# Patient Record
Sex: FEMALE | Race: WHITE
[De-identification: names, ages, dates, MRNs, and addresses within clinical notes are randomized per-mention and may not be internally consistent; named-entity substitution may affect disease eponyms.]

---

## 2017-01-02 ENCOUNTER — HOSPITAL ENCOUNTER (OUTPATIENT)
Dept: HOSPITAL 45 - C.LABUPUNI | Age: 82
Discharge: NURSING, REHAB OR LONG-TERM CARE | End: 2017-01-02
Attending: HOSPITALIST
Payer: COMMERCIAL

## 2017-01-02 DIAGNOSIS — I48.2: Primary | ICD-10-CM

## 2017-01-02 LAB
INR PPP: 3.7 (ref 0.9–1.1)
PROTHROMBIN TIME: 42.3 SECONDS (ref 9–12)

## 2017-01-05 ENCOUNTER — HOSPITAL ENCOUNTER (OUTPATIENT)
Dept: HOSPITAL 45 - C.LABUPUNI | Age: 82
Discharge: NURSING, REHAB OR LONG-TERM CARE | End: 2017-01-05
Attending: HOSPITALIST
Payer: COMMERCIAL

## 2017-01-05 DIAGNOSIS — E03.9: ICD-10-CM

## 2017-01-05 DIAGNOSIS — M62.81: Primary | ICD-10-CM

## 2017-01-05 LAB
ALBUMIN/GLOB SERPL: 0.7 {RATIO} (ref 0.9–2)
ALP SERPL-CCNC: 59 U/L (ref 45–117)
ALT SERPL-CCNC: 12 U/L (ref 12–78)
ANION GAP SERPL CALC-SCNC: 9 MMOL/L (ref 3–11)
AST SERPL-CCNC: 20 U/L (ref 15–37)
BASOPHILS # BLD: 0.04 K/UL (ref 0–0.2)
BASOPHILS NFR BLD: 0.6 %
BUN SERPL-MCNC: 17 MG/DL (ref 7–18)
BUN/CREAT SERPL: 17.1 (ref 10–20)
CALCIUM SERPL-MCNC: 8.3 MG/DL (ref 8.5–10.1)
CHLORIDE SERPL-SCNC: 108 MMOL/L (ref 98–107)
CO2 SERPL-SCNC: 26 MMOL/L (ref 21–32)
COMPLETE: YES
CREAT SERPL-MCNC: 0.98 MG/DL (ref 0.6–1.2)
EOSINOPHIL NFR BLD AUTO: 249 K/UL (ref 130–400)
GLOBULIN SER-MCNC: 4 GM/DL (ref 2.5–4)
GLUCOSE SERPL-MCNC: 74 MG/DL (ref 70–99)
HCT VFR BLD CALC: 31.3 % (ref 37–47)
IG%: 0.2 %
IMM GRANULOCYTES NFR BLD AUTO: 41.2 %
LYMPHOCYTES # BLD: 2.63 K/UL (ref 1.2–3.4)
MCH RBC QN AUTO: 29.5 PG (ref 25–34)
MCHC RBC AUTO-ENTMCNC: 32.3 G/DL (ref 32–36)
MCV RBC AUTO: 91.5 FL (ref 80–100)
MONOCYTES NFR BLD: 11.3 %
NEUTROPHILS # BLD AUTO: 5.2 %
NEUTROPHILS NFR BLD AUTO: 41.5 %
PMV BLD AUTO: 10.4 FL (ref 7.4–10.4)
POTASSIUM SERPL-SCNC: 3.9 MMOL/L (ref 3.5–5.1)
RBC # BLD AUTO: 3.42 M/UL (ref 4.2–5.4)
SODIUM SERPL-SCNC: 143 MMOL/L (ref 136–145)
TSH SERPL-ACNC: 1.35 UIU/ML (ref 0.3–4.5)
WBC # BLD AUTO: 6.39 K/UL (ref 4.8–10.8)

## 2017-01-06 ENCOUNTER — HOSPITAL ENCOUNTER (OUTPATIENT)
Dept: HOSPITAL 45 - C.LABUPUNI | Age: 82
Discharge: NURSING, REHAB OR LONG-TERM CARE | End: 2017-01-06
Attending: HOSPITALIST
Payer: COMMERCIAL

## 2017-01-06 DIAGNOSIS — F33.9: ICD-10-CM

## 2017-01-06 DIAGNOSIS — M16.9: ICD-10-CM

## 2017-01-06 DIAGNOSIS — E03.9: Primary | ICD-10-CM

## 2017-01-06 DIAGNOSIS — D64.89: ICD-10-CM

## 2017-01-06 DIAGNOSIS — M17.9: ICD-10-CM

## 2017-01-06 DIAGNOSIS — E78.5: ICD-10-CM

## 2017-01-06 DIAGNOSIS — F10.97: ICD-10-CM

## 2017-01-06 LAB
ALP SERPL-CCNC: 53 U/L (ref 45–117)
ALT SERPL-CCNC: 13 U/L (ref 12–78)
ANION GAP SERPL CALC-SCNC: 9 MMOL/L (ref 3–11)
AST SERPL-CCNC: 21 U/L (ref 15–37)
BUN SERPL-MCNC: 15 MG/DL (ref 7–18)
BUN/CREAT SERPL: 16.6 (ref 10–20)
CALCIUM SERPL-MCNC: 8.1 MG/DL (ref 8.5–10.1)
CHLORIDE SERPL-SCNC: 107 MMOL/L (ref 98–107)
CHOLEST/HDLC SERPL: 3.5 {RATIO}
CO2 SERPL-SCNC: 27 MMOL/L (ref 21–32)
CREAT SERPL-MCNC: 0.92 MG/DL (ref 0.6–1.2)
GLUCOSE SERPL-MCNC: 70 MG/DL (ref 70–99)
GLUCOSE UR QL: 33 MG/DL
INR PPP: 5.5 (ref 0.9–1.1)
KETONES UR QL STRIP: 45 MG/DL
NITRITE UR QL STRIP: 181 MG/DL (ref 0–150)
PH UR: 114 MG/DL (ref 0–200)
POTASSIUM SERPL-SCNC: 3.8 MMOL/L (ref 3.5–5.1)
PROTHROMBIN TIME: 62.7 SECONDS (ref 9–12)
SODIUM SERPL-SCNC: 143 MMOL/L (ref 136–145)
TSH SERPL-ACNC: 1.38 UIU/ML (ref 0.3–4.5)
VERY LOW DENSITY LIPOPROT CALC: 36 MG/DL

## 2017-01-07 ENCOUNTER — HOSPITAL ENCOUNTER (OUTPATIENT)
Dept: HOSPITAL 45 - C.LABUPUNI | Age: 82
Discharge: NURSING, REHAB OR LONG-TERM CARE | End: 2017-01-07
Attending: HOSPITALIST
Payer: COMMERCIAL

## 2017-01-07 DIAGNOSIS — I48.2: Primary | ICD-10-CM

## 2017-01-07 LAB
INR PPP: 6.3 (ref 0.9–1.1)
PROTHROMBIN TIME: 73 SECONDS (ref 9–12)

## 2017-01-09 ENCOUNTER — HOSPITAL ENCOUNTER (OUTPATIENT)
Dept: HOSPITAL 45 - C.LABUPUNI | Age: 82
Discharge: NURSING, REHAB OR LONG-TERM CARE | End: 2017-01-09
Attending: HOSPITALIST
Payer: COMMERCIAL

## 2017-01-09 DIAGNOSIS — Z79.01: Primary | ICD-10-CM

## 2017-01-09 DIAGNOSIS — Z51.81: ICD-10-CM

## 2017-01-09 LAB
INR PPP: 2.8 (ref 0.9–1.1)
PROTHROMBIN TIME: 31.4 SECONDS (ref 9–12)

## 2017-01-09 NOTE — CODING QUERY MEDICAL NECESSITY
SUPPORTING DIAGNOSIS NEEDED





A supporting diagnosis is required for the test/procedure performed on this patient in 
order for us to be reimbursed by the patient's insurance. Please provide a supporting 
diagnosis for the following test/procedure listed below next to the test name along with 
your signature. 



*If there is no additional diagnosis for this patient that would support the following 
test/procedure please document that below next to the test/procedure.



Test(s)/Procedure(s) that require a supporting diagnosis:

DOS 1/5

* Vitamin D      DIAGNOSIS:



Provider Signature:  ______________________________  Date:  _______



Thank you  

Faith Joseph

Health Information Management

Phone:  [phone]

Fax:  [phone]



Once completed, please kindly fax back to [phone]



For questions please call [phone]

## 2017-01-13 ENCOUNTER — HOSPITAL ENCOUNTER (OUTPATIENT)
Dept: HOSPITAL 45 - C.LABUPUNI | Age: 82
Discharge: NURSING, REHAB OR LONG-TERM CARE | End: 2017-01-13
Attending: HOSPITALIST
Payer: COMMERCIAL

## 2017-01-13 DIAGNOSIS — I48.91: Primary | ICD-10-CM

## 2017-01-13 LAB
INR PPP: 2.6 (ref 0.9–1.1)
PROTHROMBIN TIME: 29.1 SECONDS (ref 9–12)

## 2017-01-16 ENCOUNTER — HOSPITAL ENCOUNTER (OUTPATIENT)
Dept: HOSPITAL 45 - C.LABUPUNI | Age: 82
Discharge: NURSING, REHAB OR LONG-TERM CARE | End: 2017-01-16
Attending: HOSPITALIST
Payer: COMMERCIAL

## 2017-01-16 DIAGNOSIS — I48.2: Primary | ICD-10-CM

## 2017-01-16 LAB
INR PPP: 3 (ref 0.9–1.1)
PROTHROMBIN TIME: 34.1 SECONDS (ref 9–12)

## 2017-01-17 NOTE — CODING QUERY NO DIAGNOSIS
TREATMENT RENDERED WITHOUT A DIAGNOSIS                                                  

 [birth year]





To promote full compliance with coding requirements relating to patient care, physician 
participation is requested in all cases of  uncertainty.  Please assist us with 
providing a diagnosis/symptom for the test(s) below:



A diagnosis/symptom was not documented on your Order.  A valid diagnosis/symptom is 
required to bill all insurances.



**Please remember that we are unable to code a diagnosis of rule out, probable, possible, 
questionable, or suspected.  





DOS 17

Tests that require a diagnosis:

* PT/INR                           DIAGNOSIS:





  *  ON YOUR ORDER YOU HAVE DX CODE I48.10, THIS IS AN INVALID CODE, CAN YOU PLEASE ADD 
CORRECT DX CODE





Provider Signature: _____________________________ Date: _________



Thank you  

Paula Lozada

Health Information Management

Phone:  [phone]

Fax:  [phone]



Once completed, please kindly fax back to [phone]



For questions please call [phone]

## 2017-01-23 ENCOUNTER — HOSPITAL ENCOUNTER (OUTPATIENT)
Dept: HOSPITAL 45 - C.LABUPUNI | Age: 82
Discharge: HOME | End: 2017-01-23
Attending: HOSPITALIST
Payer: COMMERCIAL

## 2017-01-23 DIAGNOSIS — I48.2: Primary | ICD-10-CM

## 2017-01-23 LAB
INR PPP: 3.6 (ref 0.9–1.1)
PROTHROMBIN TIME: 40.6 SECONDS (ref 9–12)

## 2017-01-26 ENCOUNTER — HOSPITAL ENCOUNTER (OUTPATIENT)
Dept: HOSPITAL 45 - C.LABUPUNI | Age: 82
Discharge: NURSING, REHAB OR LONG-TERM CARE | End: 2017-01-26
Attending: HOSPITALIST
Payer: COMMERCIAL

## 2017-01-26 DIAGNOSIS — I48.2: Primary | ICD-10-CM

## 2017-01-26 LAB
INR PPP: 3.6 (ref 0.9–1.1)
PROTHROMBIN TIME: 40.1 SECONDS (ref 9–12)

## 2017-01-27 ENCOUNTER — HOSPITAL ENCOUNTER (OUTPATIENT)
Dept: HOSPITAL 45 - C.LABUPUNI | Age: 82
Discharge: NURSING, REHAB OR LONG-TERM CARE | End: 2017-01-27
Attending: HOSPITALIST
Payer: COMMERCIAL

## 2017-01-27 DIAGNOSIS — I48.91: Primary | ICD-10-CM

## 2017-01-27 LAB
INR PPP: 2.8 (ref 0.9–1.1)
PROTHROMBIN TIME: 31.3 SECONDS (ref 9–12)

## 2017-01-30 ENCOUNTER — HOSPITAL ENCOUNTER (OUTPATIENT)
Dept: HOSPITAL 45 - C.LABUPUNI | Age: 82
Discharge: HOME | End: 2017-01-30
Attending: HOSPITALIST
Payer: COMMERCIAL

## 2017-01-30 DIAGNOSIS — I48.2: Primary | ICD-10-CM

## 2017-01-30 LAB
INR PPP: 2.2 (ref 0.9–1.1)
PROTHROMBIN TIME: 24.5 SECONDS (ref 9–12)

## 2017-01-31 NOTE — CODING QUERY NO DIAGNOSIS
: 1935



TREATMENT RENDERED WITHOUT A DIAGNOSIS                                                  



To promote full compliance with coding requirements relating to patient care, physician 
participation is requested in all cases of  uncertainty.  Please assist us with 
providing a diagnosis/symptom for the test(s) below:



A diagnosis/symptom was not documented on your Order.  A valid diagnosis/symptom is 
required to bill all insurances.



**Please remember that we are unable to code a diagnosis of rule out, probable, possible, 
questionable, or suspected.  



Tests that require a diagnosis:

* PROTHROMBIN TIME PRO        DOS: 17    DIAGNOSIS:





Provider Signature: _____________________________ Date: _________



Thank you  

Odalys Romeo

[employer] Information Management

Phone:  [phone]

Fax:  [phone]



Once completed, please kindly fax back to [phone]



For questions please call [phone]

## 2017-02-06 ENCOUNTER — HOSPITAL ENCOUNTER (OUTPATIENT)
Dept: HOSPITAL 45 - C.LABUPUNI | Age: 82
Discharge: NURSING, REHAB OR LONG-TERM CARE | End: 2017-02-06
Attending: HOSPITALIST
Payer: COMMERCIAL

## 2017-02-06 DIAGNOSIS — I48.2: Primary | ICD-10-CM

## 2017-02-06 LAB
INR PPP: 1.4 (ref 0.9–1.1)
PROTHROMBIN TIME: 14.8 SECONDS (ref 9–12)

## 2017-02-07 NOTE — CODING QUERY MEDICAL NECESSITY
SUPPORTING DIAGNOSIS NEEDED





A supporting diagnosis is required for the test/procedure performed on this patient in 
order for us to be reimbursed by the patient's insurance. Please provide a supporting 
diagnosis for the following test/procedure listed below next to the test name along with 
your signature. 



*If there is no additional diagnosis for this patient that would support the following 
test/procedure please document that below next to the test/procedure.



Test(s)/Procedure(s) that require a supporting diagnosis:

  *  VITAMIN D       DIAGNOSIS:

  *  VITAMIN B-12     DIAGNOSIS:

  *  DOS: 1/6/17



Provider Signature:  ______________________________  Date:  _______



Thank you  

Cora Smart

Health Information Management

Phone:  [phone]

Fax:  [phone]



Once completed, please kindly fax back to [phone]



For questions please call [phone]

## 2017-02-13 ENCOUNTER — HOSPITAL ENCOUNTER (OUTPATIENT)
Dept: HOSPITAL 45 - C.LABUPUNI | Age: 82
Discharge: NURSING, REHAB OR LONG-TERM CARE | End: 2017-02-13
Attending: HOSPITALIST
Payer: COMMERCIAL

## 2017-02-13 DIAGNOSIS — I48.2: Primary | ICD-10-CM

## 2017-02-13 LAB
INR PPP: 1.3 (ref 0.9–1.1)
PROTHROMBIN TIME: 14.4 SECONDS (ref 9–12)

## 2017-02-20 ENCOUNTER — HOSPITAL ENCOUNTER (OUTPATIENT)
Dept: HOSPITAL 45 - C.LABUPUNI | Age: 82
Discharge: NURSING, REHAB OR LONG-TERM CARE | End: 2017-02-20
Attending: HOSPITALIST
Payer: COMMERCIAL

## 2017-02-20 DIAGNOSIS — I48.2: Primary | ICD-10-CM

## 2017-02-20 LAB
INR PPP: 3.2 (ref 0.9–1.1)
PROTHROMBIN TIME: 36.2 SECONDS (ref 9–12)

## 2017-02-27 ENCOUNTER — HOSPITAL ENCOUNTER (OUTPATIENT)
Dept: HOSPITAL 45 - C.LABUPUNI | Age: 82
Discharge: NURSING, REHAB OR LONG-TERM CARE | End: 2017-02-27
Attending: HOSPITALIST
Payer: COMMERCIAL

## 2017-02-27 DIAGNOSIS — I48.91: Primary | ICD-10-CM

## 2017-02-27 LAB
INR PPP: 1.4 (ref 0.9–1.1)
PROTHROMBIN TIME: 15.4 SECONDS (ref 9–12)

## 2017-03-06 ENCOUNTER — HOSPITAL ENCOUNTER (OUTPATIENT)
Dept: HOSPITAL 45 - C.LABUPUNI | Age: 82
Discharge: NURSING, REHAB OR LONG-TERM CARE | End: 2017-03-06
Attending: HOSPITALIST
Payer: COMMERCIAL

## 2017-03-06 DIAGNOSIS — I48.2: Primary | ICD-10-CM

## 2017-03-06 LAB
INR PPP: 1.9 (ref 0.9–1.1)
PROTHROMBIN TIME: 21.3 SECONDS (ref 9–12)

## 2017-03-10 ENCOUNTER — HOSPITAL ENCOUNTER (OUTPATIENT)
Dept: HOSPITAL 45 - C.LABUPUNI | Age: 82
Discharge: NURSING, REHAB OR LONG-TERM CARE | End: 2017-03-10
Attending: HOSPITALIST
Payer: COMMERCIAL

## 2017-03-10 DIAGNOSIS — I48.2: Primary | ICD-10-CM

## 2017-03-10 LAB
INR PPP: 2.7 (ref 0.9–1.1)
PROTHROMBIN TIME: 30.6 SECONDS (ref 9–12)

## 2017-03-15 ENCOUNTER — HOSPITAL ENCOUNTER (OUTPATIENT)
Dept: HOSPITAL 45 - C.LABUPUNI | Age: 82
Discharge: NURSING, REHAB OR LONG-TERM CARE | End: 2017-03-15
Attending: HOSPITALIST
Payer: COMMERCIAL

## 2017-03-15 DIAGNOSIS — Z01.89: Primary | ICD-10-CM

## 2017-03-15 LAB
INR PPP: 2.1 (ref 0.9–1.1)
PROTHROMBIN TIME: 22.9 SECONDS (ref 9–12)

## 2017-03-16 NOTE — CODING QUERY NO DIAGNOSIS
:  1935



TREATMENT RENDERED WITHOUT A DIAGNOSIS                                                  



To promote full compliance with coding requirements relating to patient care, physician 
participation is requested in all cases of  uncertainty.  Please assist us with 
providing a diagnosis/symptom for the test(s) below:



A diagnosis/symptom was not documented on your Order.  A valid diagnosis/symptom is 
required to bill all insurances.



**Please remember that we are unable to code a diagnosis of rule out, probable, possible, 
questionable, or suspected.  



Tests that require a diagnosis:

DOS:  3/15/17



* Prothrombin Time Profile      DIAGNOSIS:













Provider Signature: _____________________________ Date: _________



Thank you  

Stephanie Baer

Health Information Management

Phone:  [phone]

Fax:  [phone]



Once completed, please kindly fax back to [phone]



For questions please call [phone]

## 2017-03-22 ENCOUNTER — HOSPITAL ENCOUNTER (OUTPATIENT)
Dept: HOSPITAL 45 - C.LABUPUNI | Age: 82
Discharge: NURSING, REHAB OR LONG-TERM CARE | End: 2017-03-22
Attending: HOSPITALIST
Payer: COMMERCIAL

## 2017-03-22 DIAGNOSIS — I48.91: Primary | ICD-10-CM

## 2017-03-22 LAB
INR PPP: 2.5 (ref 0.9–1.1)
PROTHROMBIN TIME: 27.9 SECONDS (ref 9–12)

## 2017-03-31 ENCOUNTER — HOSPITAL ENCOUNTER (OUTPATIENT)
Dept: HOSPITAL 45 - C.LABUPUNI | Age: 82
Discharge: NURSING, REHAB OR LONG-TERM CARE | End: 2017-03-31
Attending: HOSPITALIST
Payer: COMMERCIAL

## 2017-03-31 DIAGNOSIS — I48.2: Primary | ICD-10-CM

## 2017-03-31 LAB
EOSINOPHIL NFR BLD AUTO: 278 K/UL (ref 130–400)
HCT VFR BLD CALC: 35 % (ref 37–47)
MCH RBC QN AUTO: 28.9 PG (ref 25–34)
MCHC RBC AUTO-ENTMCNC: 32 G/DL (ref 32–36)
MCV RBC AUTO: 90.2 FL (ref 80–100)
PMV BLD AUTO: 10.3 FL (ref 7.4–10.4)
RBC # BLD AUTO: 3.88 M/UL (ref 4.2–5.4)
WBC # BLD AUTO: 7.15 K/UL (ref 4.8–10.8)

## 2017-04-05 ENCOUNTER — HOSPITAL ENCOUNTER (OUTPATIENT)
Dept: HOSPITAL 45 - C.LABUPUNI | Age: 82
Discharge: NURSING, REHAB OR LONG-TERM CARE | End: 2017-04-05
Attending: FAMILY MEDICINE
Payer: COMMERCIAL

## 2017-04-05 DIAGNOSIS — I48.91: Primary | ICD-10-CM

## 2017-04-05 LAB
INR PPP: 2.6 (ref 0.9–1.1)
PROTHROMBIN TIME: 28.7 SECONDS (ref 9–12)

## 2017-05-10 ENCOUNTER — HOSPITAL ENCOUNTER (OUTPATIENT)
Dept: HOSPITAL 45 - C.LABUPUNI | Age: 82
Discharge: NURSING, REHAB OR LONG-TERM CARE | End: 2017-05-10
Attending: FAMILY MEDICINE
Payer: COMMERCIAL

## 2017-05-10 DIAGNOSIS — I48.91: Primary | ICD-10-CM

## 2017-05-10 LAB
INR PPP: 2.6 (ref 0.9–1.1)
PROTHROMBIN TIME: 29.2 SECONDS (ref 9–12)

## 2017-05-22 ENCOUNTER — HOSPITAL ENCOUNTER (OUTPATIENT)
Dept: HOSPITAL 45 - C.LABUPUNI | Age: 82
Discharge: NURSING, REHAB OR LONG-TERM CARE | End: 2017-05-22
Attending: FAMILY MEDICINE
Payer: COMMERCIAL

## 2017-05-22 DIAGNOSIS — I48.91: Primary | ICD-10-CM

## 2017-05-22 LAB
INR PPP: 1.9 (ref 0.9–1.1)
PROTHROMBIN TIME: 20.5 SECONDS (ref 9–12)

## 2017-05-24 ENCOUNTER — HOSPITAL ENCOUNTER (OUTPATIENT)
Dept: HOSPITAL 45 - C.LABUPUNI | Age: 82
Discharge: NURSING, REHAB OR LONG-TERM CARE | End: 2017-05-24
Attending: FAMILY MEDICINE
Payer: COMMERCIAL

## 2017-05-24 DIAGNOSIS — J44.9: ICD-10-CM

## 2017-05-24 DIAGNOSIS — E03.9: ICD-10-CM

## 2017-05-24 DIAGNOSIS — E55.9: ICD-10-CM

## 2017-05-24 DIAGNOSIS — N39.0: ICD-10-CM

## 2017-05-24 DIAGNOSIS — E56.8: Primary | ICD-10-CM

## 2017-05-24 DIAGNOSIS — M62.81: ICD-10-CM

## 2017-05-24 LAB
ANION GAP SERPL CALC-SCNC: 4 MMOL/L (ref 3–11)
APPEARANCE UR: (no result)
BASOPHILS # BLD: 0.02 K/UL (ref 0–0.2)
BASOPHILS NFR BLD: 0.3 %
BILIRUB UR-MCNC: (no result) MG/DL
BUN SERPL-MCNC: 19 MG/DL (ref 7–18)
BUN/CREAT SERPL: 20.2 (ref 10–20)
CALCIUM SERPL-MCNC: 8.8 MG/DL (ref 8.5–10.1)
CHLORIDE SERPL-SCNC: 108 MMOL/L (ref 98–107)
CO2 SERPL-SCNC: 30 MMOL/L (ref 21–32)
COLOR UR: (no result)
COMPLETE: YES
CREAT SERPL-MCNC: 0.96 MG/DL (ref 0.6–1.2)
EOSINOPHIL NFR BLD AUTO: 294 K/UL (ref 130–400)
GLUCOSE SERPL-MCNC: 86 MG/DL (ref 70–99)
HCT VFR BLD CALC: 32.8 % (ref 37–47)
IG%: 0.1 %
IMM GRANULOCYTES NFR BLD AUTO: 40.7 %
LYMPHOCYTES # BLD: 2.95 K/UL (ref 1.2–3.4)
MANUAL MICROSCOPIC REQUIRED?: NO
MCH RBC QN AUTO: 29.4 PG (ref 25–34)
MCHC RBC AUTO-ENTMCNC: 31.7 G/DL (ref 32–36)
MCV RBC AUTO: 92.7 FL (ref 80–100)
MONOCYTES NFR BLD: 10.2 %
NEUTROPHILS # BLD AUTO: 3.6 %
NEUTROPHILS NFR BLD AUTO: 45.1 %
NITRITE UR QL STRIP: (no result)
PH UR STRIP: 5.5 [PH] (ref 4.5–7.5)
PMV BLD AUTO: 10.5 FL (ref 7.4–10.4)
POTASSIUM SERPL-SCNC: 4.1 MMOL/L (ref 3.5–5.1)
RBC # BLD AUTO: 3.54 M/UL (ref 4.2–5.4)
REVIEW REQ?: YES
SODIUM SERPL-SCNC: 142 MMOL/L (ref 136–145)
SP GR UR STRIP: 1.02 (ref 1–1.03)
TSH SERPL-ACNC: 0.43 UIU/ML (ref 0.3–4.5)
URINE BILL WITH OR WITHOUT MIC: (no result)
URINE EPITHELIAL CELL AUTO: >30 /LPF (ref 0–5)
UROBILINOGEN UR-MCNC: (no result) MG/DL
WBC # BLD AUTO: 7.24 K/UL (ref 4.8–10.8)

## 2017-05-30 ENCOUNTER — HOSPITAL ENCOUNTER (OUTPATIENT)
Dept: HOSPITAL 45 - C.LABUPUNI | Age: 82
Discharge: HOME | End: 2017-05-30
Attending: FAMILY MEDICINE
Payer: COMMERCIAL

## 2017-05-30 DIAGNOSIS — D64.89: Primary | ICD-10-CM

## 2017-05-30 LAB
INR PPP: 2.3 (ref 0.9–1.1)
PROTHROMBIN TIME: 25.7 SECONDS (ref 9–12)

## 2017-06-01 ENCOUNTER — HOSPITAL ENCOUNTER (OUTPATIENT)
Dept: HOSPITAL 45 - C.LABUPUNI | Age: 82
Discharge: NURSING, REHAB OR LONG-TERM CARE | End: 2017-06-01
Attending: NURSE PRACTITIONER
Payer: COMMERCIAL

## 2017-06-01 DIAGNOSIS — I48.2: Primary | ICD-10-CM

## 2017-06-01 LAB
INR PPP: 1.9 (ref 0.9–1.1)
PROTHROMBIN TIME: 21.4 SECONDS (ref 9–12)

## 2017-06-05 NOTE — CODING QUERY MEDICAL NECESSITY
SUPPORTING DIAGNOSIS NEEDED



Dr. Medrano,



A supporting diagnosis is required for the test/procedure performed on this patient in 
order for us to be reimbursed by the patient's insurance. Please provide a supporting 
diagnosis for the following test/procedure listed below next to the test name along with 
your signature. 



*If there is no additional diagnosis for this patient that would support the following 
test/procedure please document that below next to the test/procedure.



Test(s)/Procedure(s) that require a supporting diagnosis:





* ([id number]) VITAMIN D ASSAY          DIAGNOSIS:





***DATE OF SERVICE: 5/24/17***





Provider Signature:  ______________________________  Date:  _______



Thank you  

Jairo Chavez

[facility] Information Management

Phone:  [phone]

Fax:  [phone]



Once completed, please kindly fax back to [phone]



For questions please call [phone]

## 2017-06-08 ENCOUNTER — HOSPITAL ENCOUNTER (OUTPATIENT)
Dept: HOSPITAL 45 - C.LABUPUNI | Age: 82
End: 2017-06-08
Attending: FAMILY MEDICINE
Payer: COMMERCIAL

## 2017-06-08 DIAGNOSIS — I48.0: Primary | ICD-10-CM

## 2017-06-08 LAB
INR PPP: 1.6 (ref 0.9–1.1)
PROTHROMBIN TIME: 17.6 SECONDS (ref 9–12)

## 2017-06-12 ENCOUNTER — HOSPITAL ENCOUNTER (OUTPATIENT)
Dept: HOSPITAL 45 - C.LABUPUNI | Age: 82
Discharge: NURSING, REHAB OR LONG-TERM CARE | End: 2017-06-12
Attending: FAMILY MEDICINE
Payer: COMMERCIAL

## 2017-06-12 DIAGNOSIS — I48.91: Primary | ICD-10-CM

## 2017-06-12 LAB
INR PPP: 2 (ref 0.9–1.1)
PROTHROMBIN TIME: 21.5 SECONDS (ref 9–12)

## 2017-06-19 ENCOUNTER — HOSPITAL ENCOUNTER (OUTPATIENT)
Dept: HOSPITAL 45 - C.LABUPUNI | Age: 82
Discharge: HOME | End: 2017-06-19
Attending: FAMILY MEDICINE
Payer: COMMERCIAL

## 2017-06-19 DIAGNOSIS — I48.2: Primary | ICD-10-CM

## 2017-06-19 LAB
INR PPP: 2.9 (ref 0.9–1.1)
PROTHROMBIN TIME: 32.5 SECONDS (ref 9–12)

## 2017-06-22 ENCOUNTER — HOSPITAL ENCOUNTER (OUTPATIENT)
Dept: HOSPITAL 45 - C.LABUPUNI | Age: 82
Discharge: NURSING, REHAB OR LONG-TERM CARE | End: 2017-06-22
Attending: FAMILY MEDICINE
Payer: COMMERCIAL

## 2017-06-22 DIAGNOSIS — I48.2: Primary | ICD-10-CM

## 2017-06-22 LAB
INR PPP: 2 (ref 0.9–1.1)
PROTHROMBIN TIME: 21.5 SECONDS (ref 9–12)

## 2017-06-29 ENCOUNTER — HOSPITAL ENCOUNTER (OUTPATIENT)
Dept: HOSPITAL 45 - C.LABUPUNI | Age: 82
Discharge: NURSING, REHAB OR LONG-TERM CARE | End: 2017-06-29
Attending: FAMILY MEDICINE
Payer: COMMERCIAL

## 2017-06-29 DIAGNOSIS — I48.91: Primary | ICD-10-CM

## 2017-06-29 LAB
INR PPP: 2.6 (ref 0.9–1.1)
PROTHROMBIN TIME: 28.5 SECONDS (ref 9–12)

## 2017-07-07 ENCOUNTER — HOSPITAL ENCOUNTER (OUTPATIENT)
Dept: HOSPITAL 45 - C.LABUPUNI | Age: 82
Discharge: NURSING, REHAB OR LONG-TERM CARE | End: 2017-07-07
Attending: FAMILY MEDICINE
Payer: COMMERCIAL

## 2017-07-07 DIAGNOSIS — Z01.89: Primary | ICD-10-CM

## 2017-07-07 LAB
ALBUMIN/GLOB SERPL: 0.6 {RATIO} (ref 0.9–2)
ALP SERPL-CCNC: 50 U/L (ref 45–117)
ALT SERPL-CCNC: 16 U/L (ref 12–78)
ANION GAP SERPL CALC-SCNC: 7 MMOL/L (ref 3–11)
AST SERPL-CCNC: 19 U/L (ref 15–37)
BUN SERPL-MCNC: 15 MG/DL (ref 7–18)
BUN/CREAT SERPL: 15 (ref 10–20)
CALCIUM SERPL-MCNC: 9.1 MG/DL (ref 8.5–10.1)
CHLORIDE SERPL-SCNC: 107 MMOL/L (ref 98–107)
CHOLEST/HDLC SERPL: 3.2 {RATIO}
CO2 SERPL-SCNC: 28 MMOL/L (ref 21–32)
CREAT SERPL-MCNC: 1 MG/DL (ref 0.6–1.2)
EOSINOPHIL NFR BLD AUTO: 236 K/UL (ref 130–400)
GLOBULIN SER-MCNC: 4.3 GM/DL (ref 2.5–4)
GLUCOSE SERPL-MCNC: 74 MG/DL (ref 70–99)
GLUCOSE UR QL: 32 MG/DL
HCT VFR BLD CALC: 34.5 % (ref 37–47)
KETONES UR QL STRIP: 43 MG/DL
MCH RBC QN AUTO: 29.1 PG (ref 25–34)
MCHC RBC AUTO-ENTMCNC: 31.3 G/DL (ref 32–36)
MCV RBC AUTO: 93 FL (ref 80–100)
NITRITE UR QL STRIP: 135 MG/DL (ref 0–150)
PH UR: 102 MG/DL (ref 0–200)
PMV BLD AUTO: 10.8 FL (ref 7.4–10.4)
POTASSIUM SERPL-SCNC: 4.1 MMOL/L (ref 3.5–5.1)
RBC # BLD AUTO: 3.71 M/UL (ref 4.2–5.4)
SODIUM SERPL-SCNC: 142 MMOL/L (ref 136–145)
TSH SERPL-ACNC: 0.29 UIU/ML (ref 0.3–4.5)
VERY LOW DENSITY LIPOPROT CALC: 27 MG/DL
WBC # BLD AUTO: 5.82 K/UL (ref 4.8–10.8)

## 2017-07-13 ENCOUNTER — HOSPITAL ENCOUNTER (OUTPATIENT)
Dept: HOSPITAL 45 - C.LABUPUNI | Age: 82
Discharge: NURSING, REHAB OR LONG-TERM CARE | End: 2017-07-13
Attending: NURSE PRACTITIONER
Payer: COMMERCIAL

## 2017-07-13 DIAGNOSIS — I48.91: Primary | ICD-10-CM

## 2017-07-13 LAB
INR PPP: 2.2 (ref 0.9–1.1)
PROTHROMBIN TIME: 24.6 SECONDS (ref 9–12)

## 2017-07-27 ENCOUNTER — HOSPITAL ENCOUNTER (OUTPATIENT)
Dept: HOSPITAL 45 - C.LABUPUNI | Age: 82
Discharge: HOME | End: 2017-07-27
Attending: FAMILY MEDICINE
Payer: COMMERCIAL

## 2017-07-27 DIAGNOSIS — I48.91: ICD-10-CM

## 2017-07-27 DIAGNOSIS — Z51.81: Primary | ICD-10-CM

## 2017-07-27 DIAGNOSIS — Z79.01: ICD-10-CM

## 2017-07-27 LAB
INR PPP: 2.9 (ref 0.9–1.1)
PROTHROMBIN TIME: 32.8 SECONDS (ref 9–12)

## 2017-08-10 ENCOUNTER — HOSPITAL ENCOUNTER (OUTPATIENT)
Dept: HOSPITAL 45 - C.LABUPUNI | Age: 82
Discharge: NURSING, REHAB OR LONG-TERM CARE | End: 2017-08-10
Attending: NURSE PRACTITIONER
Payer: COMMERCIAL

## 2017-08-10 DIAGNOSIS — I48.91: Primary | ICD-10-CM

## 2017-08-10 LAB
INR PPP: 2.2 (ref 0.9–1.1)
PROTHROMBIN TIME: 24.8 SECONDS (ref 9–12)

## 2017-08-18 ENCOUNTER — HOSPITAL ENCOUNTER (OUTPATIENT)
Dept: HOSPITAL 45 - C.LABUPUNI | Age: 82
Discharge: NURSING, REHAB OR LONG-TERM CARE | End: 2017-08-18
Attending: NURSE PRACTITIONER
Payer: COMMERCIAL

## 2017-08-18 DIAGNOSIS — E03.9: Primary | ICD-10-CM

## 2017-09-18 ENCOUNTER — HOSPITAL ENCOUNTER (OUTPATIENT)
Dept: HOSPITAL 45 - C.LABUPNIT | Age: 82
Discharge: NURSING, REHAB OR LONG-TERM CARE | End: 2017-09-18
Attending: NURSE PRACTITIONER
Payer: COMMERCIAL

## 2017-09-18 DIAGNOSIS — R41.82: Primary | ICD-10-CM

## 2017-10-03 ENCOUNTER — HOSPITAL ENCOUNTER (OUTPATIENT)
Dept: HOSPITAL 45 - C.LABUPUNI | Age: 82
Discharge: HOME | End: 2017-10-03
Attending: NURSE PRACTITIONER
Payer: COMMERCIAL

## 2017-10-03 DIAGNOSIS — N39.0: Primary | ICD-10-CM

## 2017-10-03 LAB
APPEARANCE UR: (no result)
BILIRUB UR-MCNC: (no result) MG/DL
COLOR UR: (no result)
MANUAL MICROSCOPIC REQUIRED?: NO
NITRITE UR QL STRIP: (no result)
PH UR STRIP: 6.5 [PH] (ref 4.5–7.5)
REVIEW REQ?: YES
SP GR UR STRIP: 1.02 (ref 1–1.03)
URINE BILL WITH OR WITHOUT MIC: (no result)
URINE EPITHELIAL CELL AUTO: >30 /LPF (ref 0–5)
URINE PATH CASTS: (no result) /LPF
UROBILINOGEN UR-MCNC: (no result) MG/DL

## 2017-10-04 ENCOUNTER — HOSPITAL ENCOUNTER (OUTPATIENT)
Dept: HOSPITAL 45 - C.LABUPUNI | Age: 82
Discharge: NURSING, REHAB OR LONG-TERM CARE | End: 2017-10-04
Attending: NURSE PRACTITIONER
Payer: COMMERCIAL

## 2017-10-04 DIAGNOSIS — I10: Primary | ICD-10-CM

## 2017-10-04 DIAGNOSIS — M62.81: ICD-10-CM

## 2017-10-04 LAB
ANION GAP SERPL CALC-SCNC: 6 MMOL/L (ref 3–11)
BASOPHILS # BLD: 0.02 K/UL (ref 0–0.2)
BASOPHILS NFR BLD: 0.2 %
BUN SERPL-MCNC: 14 MG/DL (ref 7–18)
BUN/CREAT SERPL: 16.4 (ref 10–20)
CALCIUM SERPL-MCNC: 8.8 MG/DL (ref 8.5–10.1)
CHLORIDE SERPL-SCNC: 105 MMOL/L (ref 98–107)
CO2 SERPL-SCNC: 28 MMOL/L (ref 21–32)
COMPLETE: YES
CREAT SERPL-MCNC: 0.86 MG/DL (ref 0.6–1.2)
EOSINOPHIL NFR BLD AUTO: 197 K/UL (ref 130–400)
GLUCOSE SERPL-MCNC: 79 MG/DL (ref 70–99)
HCT VFR BLD CALC: 33.8 % (ref 37–47)
IG%: 0.2 %
IMM GRANULOCYTES NFR BLD AUTO: 31.5 %
LYMPHOCYTES # BLD: 2.62 K/UL (ref 1.2–3.4)
MCH RBC QN AUTO: 29.8 PG (ref 25–34)
MCHC RBC AUTO-ENTMCNC: 32 G/DL (ref 32–36)
MCV RBC AUTO: 93.4 FL (ref 80–100)
MONOCYTES NFR BLD: 10.8 %
NEUTROPHILS # BLD AUTO: 3.1 %
NEUTROPHILS NFR BLD AUTO: 54.2 %
PMV BLD AUTO: 10.6 FL (ref 7.4–10.4)
POTASSIUM SERPL-SCNC: 3.6 MMOL/L (ref 3.5–5.1)
RBC # BLD AUTO: 3.62 M/UL (ref 4.2–5.4)
SODIUM SERPL-SCNC: 139 MMOL/L (ref 136–145)
WBC # BLD AUTO: 8.33 K/UL (ref 4.8–10.8)

## 2018-01-08 ENCOUNTER — HOSPITAL ENCOUNTER (OUTPATIENT)
Dept: HOSPITAL 45 - C.LABUPUNI | Age: 83
Discharge: NURSING, REHAB OR LONG-TERM CARE | End: 2018-01-08
Attending: NURSE PRACTITIONER
Payer: COMMERCIAL

## 2018-01-08 DIAGNOSIS — D64.89: ICD-10-CM

## 2018-01-08 DIAGNOSIS — I10: Primary | ICD-10-CM

## 2018-01-08 DIAGNOSIS — F10.97: ICD-10-CM

## 2018-01-08 DIAGNOSIS — M62.81: ICD-10-CM

## 2018-01-08 DIAGNOSIS — E78.5: ICD-10-CM

## 2018-01-08 DIAGNOSIS — E03.9: ICD-10-CM

## 2018-01-08 LAB
ALBUMIN SERPL-MCNC: 2.7 GM/DL (ref 3.4–5)
ALP SERPL-CCNC: 49 U/L (ref 45–117)
ALT SERPL-CCNC: 16 U/L (ref 12–78)
AST SERPL-CCNC: 19 U/L (ref 15–37)
BUN SERPL-MCNC: 11 MG/DL (ref 7–18)
CALCIUM SERPL-MCNC: 8.8 MG/DL (ref 8.5–10.1)
CO2 SERPL-SCNC: 29 MMOL/L (ref 21–32)
CREAT SERPL-MCNC: 0.91 MG/DL (ref 0.6–1.2)
EOSINOPHIL NFR BLD AUTO: 248 K/UL (ref 130–400)
GLUCOSE SERPL-MCNC: 75 MG/DL (ref 70–99)
HCT VFR BLD CALC: 34 % (ref 37–47)
HGB BLD-MCNC: 11 G/DL (ref 12–16)
KETONES UR QL STRIP: 36 MG/DL
MCH RBC QN AUTO: 30.6 PG (ref 25–34)
MCHC RBC AUTO-ENTMCNC: 32.4 G/DL (ref 32–36)
MCV RBC AUTO: 94.7 FL (ref 80–100)
PH UR: 93 MG/DL (ref 0–200)
PMV BLD AUTO: 10.9 FL (ref 7.4–10.4)
POTASSIUM SERPL-SCNC: 4.1 MMOL/L (ref 3.5–5.1)
PROT SERPL-MCNC: 6.7 GM/DL (ref 6.4–8.2)
RED CELL DISTRIBUTION WIDTH CV: 15.8 % (ref 11.5–14.5)
RED CELL DISTRIBUTION WIDTH SD: 54.6 FL (ref 36.4–46.3)
SODIUM SERPL-SCNC: 140 MMOL/L (ref 136–145)
WBC # BLD AUTO: 6.24 K/UL (ref 4.8–10.8)

## 2018-03-15 ENCOUNTER — HOSPITAL ENCOUNTER (OUTPATIENT)
Dept: HOSPITAL 45 - C.LABUPUNI | Age: 83
Discharge: HOME | End: 2018-03-15
Attending: NURSE PRACTITIONER
Payer: COMMERCIAL

## 2018-03-15 DIAGNOSIS — I48.2: Primary | ICD-10-CM

## 2018-03-15 DIAGNOSIS — D64.89: ICD-10-CM

## 2018-03-15 DIAGNOSIS — E03.9: ICD-10-CM

## 2018-03-15 DIAGNOSIS — I10: ICD-10-CM

## 2018-03-15 LAB
ALBUMIN SERPL-MCNC: 2.8 GM/DL (ref 3.4–5)
ALP SERPL-CCNC: 56 U/L (ref 45–117)
ALT SERPL-CCNC: 14 U/L (ref 12–78)
AST SERPL-CCNC: 18 U/L (ref 15–37)
BUN SERPL-MCNC: 20 MG/DL (ref 7–18)
CALCIUM SERPL-MCNC: 9.1 MG/DL (ref 8.5–10.1)
CO2 SERPL-SCNC: 27 MMOL/L (ref 21–32)
CREAT SERPL-MCNC: 0.87 MG/DL (ref 0.6–1.2)
EOSINOPHIL NFR BLD AUTO: 200 K/UL (ref 130–400)
GLUCOSE SERPL-MCNC: 81 MG/DL (ref 70–99)
HCT VFR BLD CALC: 36 % (ref 37–47)
HGB BLD-MCNC: 11.4 G/DL (ref 12–16)
MCH RBC QN AUTO: 29.6 PG (ref 25–34)
MCHC RBC AUTO-ENTMCNC: 31.7 G/DL (ref 32–36)
MCV RBC AUTO: 93.5 FL (ref 80–100)
PMV BLD AUTO: 10.8 FL (ref 7.4–10.4)
POTASSIUM SERPL-SCNC: 4 MMOL/L (ref 3.5–5.1)
PROT SERPL-MCNC: 6.8 GM/DL (ref 6.4–8.2)
RED CELL DISTRIBUTION WIDTH CV: 15.3 % (ref 11.5–14.5)
RED CELL DISTRIBUTION WIDTH SD: 52 FL (ref 36.4–46.3)
SODIUM SERPL-SCNC: 140 MMOL/L (ref 136–145)
WBC # BLD AUTO: 6.66 K/UL (ref 4.8–10.8)

## 2018-03-26 ENCOUNTER — HOSPITAL ENCOUNTER (OUTPATIENT)
Dept: HOSPITAL 45 - C.LABUPUNI | Age: 83
Discharge: NURSING, REHAB OR LONG-TERM CARE | End: 2018-03-26
Attending: NURSE PRACTITIONER
Payer: COMMERCIAL

## 2018-03-26 DIAGNOSIS — F33.9: Primary | ICD-10-CM

## 2018-03-29 ENCOUNTER — HOSPITAL ENCOUNTER (OUTPATIENT)
Dept: HOSPITAL 45 - C.LABUPUNI | Age: 83
Discharge: NURSING, REHAB OR LONG-TERM CARE | End: 2018-03-29
Attending: NURSE PRACTITIONER
Payer: COMMERCIAL

## 2018-03-29 DIAGNOSIS — E03.9: Primary | ICD-10-CM

## 2018-04-27 ENCOUNTER — HOSPITAL ENCOUNTER (OUTPATIENT)
Dept: HOSPITAL 45 - C.LABUPUNI | Age: 83
Discharge: NURSING, REHAB OR LONG-TERM CARE | End: 2018-04-27
Attending: NURSE PRACTITIONER
Payer: COMMERCIAL

## 2018-04-27 DIAGNOSIS — E03.9: Primary | ICD-10-CM

## 2018-05-14 ENCOUNTER — HOSPITAL ENCOUNTER (OUTPATIENT)
Dept: HOSPITAL 45 - C.LABUPUNI | Age: 83
Discharge: NURSING, REHAB OR LONG-TERM CARE | End: 2018-05-14
Attending: NURSE PRACTITIONER
Payer: COMMERCIAL

## 2018-05-14 DIAGNOSIS — E03.9: Primary | ICD-10-CM
